# Patient Record
Sex: MALE
[De-identification: names, ages, dates, MRNs, and addresses within clinical notes are randomized per-mention and may not be internally consistent; named-entity substitution may affect disease eponyms.]

---

## 2021-05-11 ENCOUNTER — HOSPITAL ENCOUNTER (OUTPATIENT)
Dept: HOSPITAL 53 - M PLAIMG | Age: 31
End: 2021-05-11
Attending: INTERNAL MEDICINE

## 2021-05-11 DIAGNOSIS — Z02.71: Primary | ICD-10-CM

## 2021-05-11 NOTE — REPPI
INDICATION:

DISABILITY DETERMINATION



COMPARISON:

None.



TECHNIQUE:

AP, lateral, bilateral oblique views left foot.



FINDINGS:

Surgical changes at the head of the 1st metatarsal bone.  Overlying soft tissue

appears normal.  Remainder of the left foot is unremarkable and age-appropriate.





IMPRESSION:

Relatively appropriate postsurgical changes involving the head of the 1st metatarsal

bone.

Otherwise age-appropriate examination.





<Electronically signed by You Mcgee > 05/11/21 [id number]